# Patient Record
Sex: FEMALE | Race: WHITE | NOT HISPANIC OR LATINO | Employment: UNEMPLOYED | URBAN - METROPOLITAN AREA
[De-identification: names, ages, dates, MRNs, and addresses within clinical notes are randomized per-mention and may not be internally consistent; named-entity substitution may affect disease eponyms.]

---

## 2023-11-12 ENCOUNTER — HOSPITAL ENCOUNTER (EMERGENCY)
Facility: HOSPITAL | Age: 18
Discharge: HOME | End: 2023-11-12
Payer: MEDICAID

## 2023-11-12 VITALS
WEIGHT: 125 LBS | RESPIRATION RATE: 16 BRPM | BODY MASS INDEX: 21.34 KG/M2 | HEIGHT: 64 IN | DIASTOLIC BLOOD PRESSURE: 80 MMHG | TEMPERATURE: 97.6 F | SYSTOLIC BLOOD PRESSURE: 130 MMHG | OXYGEN SATURATION: 98 % | HEART RATE: 78 BPM

## 2023-11-12 DIAGNOSIS — L30.9 DERMATITIS: Primary | ICD-10-CM

## 2023-11-12 PROCEDURE — 99285 EMERGENCY DEPT VISIT HI MDM: CPT

## 2023-11-12 PROCEDURE — 99284 EMERGENCY DEPT VISIT MOD MDM: CPT

## 2023-11-12 PROCEDURE — 99283 EMERGENCY DEPT VISIT LOW MDM: CPT

## 2023-11-12 RX ORDER — TRIAMCINOLONE ACETONIDE 1 MG/G
1 CREAM TOPICAL 2 TIMES DAILY
Qty: 15 G | Refills: 0 | Status: SHIPPED | OUTPATIENT
Start: 2023-11-12 | End: 2023-11-19

## 2023-11-12 RX ORDER — CALAMINE, PRAMOXIND HCL 8.6; 20; 1 MG/ML; MG/ML; MG/ML
LOTION TOPICAL 2 TIMES DAILY PRN
Qty: 177 ML | Refills: 0 | Status: SHIPPED | OUTPATIENT
Start: 2023-11-12

## 2023-11-12 ASSESSMENT — COLUMBIA-SUICIDE SEVERITY RATING SCALE - C-SSRS
2. HAVE YOU ACTUALLY HAD ANY THOUGHTS OF KILLING YOURSELF?: NO
6. HAVE YOU EVER DONE ANYTHING, STARTED TO DO ANYTHING, OR PREPARED TO DO ANYTHING TO END YOUR LIFE?: NO
1. IN THE PAST MONTH, HAVE YOU WISHED YOU WERE DEAD OR WISHED YOU COULD GO TO SLEEP AND NOT WAKE UP?: NO

## 2023-11-12 NOTE — ED TRIAGE NOTES
Pt went to ER 2 weeks ago for hives from dermatitis was given PO prednisone but still having itching and new  breakouts   Spoke with patient and informed her that we did not get those orders.  Patient will send a picture of the order through the portal. Will call patient once orders are placed

## 2023-11-12 NOTE — ED PROVIDER NOTES
HPI   Chief Complaint   Patient presents with    Hives       Patient is a 18-year-old female with past medical history of bipolar disorder that presents to the ED with complaints of rash.  Went to [facility] ED 2 weeks ago, diagnosed with contact dermatitis and given p.o. prednisone.  She notes that she is still continuing to have itching and hives.  Starts in 1 place disappears and occurs another place.  Does report a history of chronic hives, states that this feels similar.  Notes that she gets rashes like this at least once a year.  Has previously been prescribed topical steroids and has found relief.  Went to [facility] ED 2 weeks ago, diagnosed with contact dermatitis and given p.o. prednisone.  She notes that she is still continuing to have itching and hives.  Feels as if the prednisone has not worked.  Has additionally been taking multiple pills of Benadryl as well as Claritin daily.  She notes that this is making her feel very sleepy.  Has never seen a dermatologist or allergist for this condition.  Notes no fever or chills, no cellulitis of skin, no discharge from the rash.  No noted rash in her mouth or genitals.  Reports that currently, she has the rash in her bilateral axilla.                          No data recorded                Patient History   History reviewed. No pertinent past medical history.  History reviewed. No pertinent surgical history.  No family history on file.  Social History     Tobacco Use    Smoking status: Not on file    Smokeless tobacco: Not on file   Substance Use Topics    Alcohol use: Not on file    Drug use: Not on file       Physical Exam   ED Triage Vitals [11/12/23 1355]   Temp Heart Rate Resp BP   36.4 °C (97.6 °F) 78 16 130/80      SpO2 Temp Source Heart Rate Source Patient Position   98 % Temporal -- --      BP Location FiO2 (%)     -- --       Physical Exam  Skin:     Findings: Rash present. Rash is macular and papular. Rash is not crusting, pustular,  scaling, urticarial or vesicular.             Comments: Maculopapular rash noted to bilateral axilla.  Lesions noted to arms that are in the healing stage as well as open sores noted, likely from scratching.  No noted edema, erythema or exudate noted coming from rash.  No noted lesions to mouth.  Patient denies any lesions to her genitalia.         ED Course & MDM   Diagnoses as of 11/12/23 1506   Dermatitis       Medical Decision Making  Patient comes today for complaints of a rash.  On exam it is noted that there is a maculopapular rash noted to bilateral axilla.  Lesions noted to arms that are in the healing stage as well as open sores noted, likely from scratching.  No noted edema, erythema or exudate noted coming from rash.  No noted lesions to mouth.  Patient denies any lesions to her genitalia.  She notes that it is itching in nature.  Reports no fever or chills.  Please see attached media for picture of the rash.  Was given p.o. prednisone and she notes that it is not helping.  She also notes that she is taking multiple pills of Benadryl as well as Claritin daily.  I did instruct her to stop taking the Benadryl and continue on with daily Claritin for the itching.  We will give her prescription for steroid cream as well as calamine lotion.  She notes in the past, steroid creams have helped.  We will also give her for referral for dermatology as needed if the rash does not improve with the steroid cream.  At this time, patient is stable and okay for discharge.      Amount and/or Complexity of Data Reviewed  External Data Reviewed: notes.        Procedure  Procedures     Eileen Jain PA-C  11/12/23 5348